# Patient Record
Sex: FEMALE | Race: WHITE
[De-identification: names, ages, dates, MRNs, and addresses within clinical notes are randomized per-mention and may not be internally consistent; named-entity substitution may affect disease eponyms.]

---

## 2017-04-29 ENCOUNTER — HOSPITAL ENCOUNTER (EMERGENCY)
Dept: HOSPITAL 4 - SED | Age: 22
Discharge: HOME | End: 2017-04-29
Payer: COMMERCIAL

## 2017-04-29 VITALS — HEIGHT: 68 IN | WEIGHT: 160 LBS | BODY MASS INDEX: 24.25 KG/M2

## 2017-04-29 VITALS — SYSTOLIC BLOOD PRESSURE: 128 MMHG

## 2017-04-29 VITALS — SYSTOLIC BLOOD PRESSURE: 108 MMHG
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
## 2017-04-29 DIAGNOSIS — T79.9XXA: ICD-10-CM

## 2017-04-29 DIAGNOSIS — T25.122A: Primary | ICD-10-CM
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
## 2017-04-29 NOTE — NUR
Patient given written and verbal discharge instructions and verbalizes 
understanding.  ER MD discussed with patient the results and treatment 
provided. Patient in stable condition. ID arm band removed.

Rx of Bacitracin given. Patient educated on pain management and to follow up 
with PMD. Pain Scale 0/10.

Opportunity for questions provided and answered.
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
## 2022-12-10 ENCOUNTER — HOSPITAL ENCOUNTER (EMERGENCY)
Dept: HOSPITAL 4 - SED | Age: 27
LOS: 1 days | Discharge: HOME | End: 2022-12-11
Payer: SELF-PAY

## 2022-12-10 VITALS — SYSTOLIC BLOOD PRESSURE: 122 MMHG

## 2022-12-10 VITALS — WEIGHT: 293 LBS | HEIGHT: 67 IN | BODY MASS INDEX: 45.99 KG/M2

## 2022-12-10 DIAGNOSIS — N39.0: Primary | ICD-10-CM

## 2022-12-10 DIAGNOSIS — Z79.899: ICD-10-CM

## 2022-12-10 DIAGNOSIS — R51.9: ICD-10-CM

## 2022-12-10 PROCEDURE — 96374 THER/PROPH/DIAG INJ IV PUSH: CPT

## 2022-12-10 PROCEDURE — 81002 URINALYSIS NONAUTO W/O SCOPE: CPT

## 2022-12-10 PROCEDURE — 96375 TX/PRO/DX INJ NEW DRUG ADDON: CPT

## 2022-12-10 PROCEDURE — 81025 URINE PREGNANCY TEST: CPT

## 2022-12-10 PROCEDURE — 96361 HYDRATE IV INFUSION ADD-ON: CPT

## 2022-12-10 PROCEDURE — 99284 EMERGENCY DEPT VISIT MOD MDM: CPT

## 2022-12-11 VITALS — SYSTOLIC BLOOD PRESSURE: 122 MMHG

## 2022-12-11 NOTE — NUR
PT FROM HOME WITH C/O MIGRAINE X1 DAY. PT DENIES N/V, DIARRHEA. PT REPORTS 
TAKING MOTRIN AT 2200 WITH NO RELIEF. SAFETY PRECAUTIONS IN PLACE AND CONNECTED 
TO THE MONITOR.

## 2022-12-11 NOTE — NUR
Patient given written and verbal discharge instructions and verbalizes 
understanding.  ER Dr. Ardon discussed with patient the results and treatment 
provided. Patient in stable condition. ID arm band removed. IV catheter removed 
intact and dressing applied, no active bleeding.

Rx of cirpo and motrin given. Patient educated on pain management and to follow 
up with PMD. Pain Scale 0.

Opportunity for questions provided and answered. Medication side effect fact 
sheet provided.